# Patient Record
Sex: FEMALE | Race: BLACK OR AFRICAN AMERICAN | NOT HISPANIC OR LATINO | URBAN - METROPOLITAN AREA
[De-identification: names, ages, dates, MRNs, and addresses within clinical notes are randomized per-mention and may not be internally consistent; named-entity substitution may affect disease eponyms.]

---

## 2019-12-27 ENCOUNTER — COMMUNICATION - HEALTHEAST (OUTPATIENT)
Dept: FAMILY MEDICINE | Facility: CLINIC | Age: 29
End: 2019-12-27

## 2020-02-03 ENCOUNTER — OFFICE VISIT - HEALTHEAST (OUTPATIENT)
Dept: SURGERY | Facility: CLINIC | Age: 30
End: 2020-02-03

## 2020-02-03 DIAGNOSIS — O22.00 VARICOSE VEINS DURING PREGNANCY, ANTEPARTUM: ICD-10-CM

## 2020-02-03 DIAGNOSIS — K40.20 NON-RECURRENT BILATERAL INGUINAL HERNIA WITHOUT OBSTRUCTION OR GANGRENE: ICD-10-CM

## 2020-02-03 DIAGNOSIS — Z33.1 PREGNANT STATE, INCIDENTAL: ICD-10-CM

## 2021-06-04 VITALS
SYSTOLIC BLOOD PRESSURE: 118 MMHG | HEART RATE: 91 BPM | WEIGHT: 182.4 LBS | DIASTOLIC BLOOD PRESSURE: 56 MMHG | OXYGEN SATURATION: 99 %

## 2021-06-04 NOTE — TELEPHONE ENCOUNTER
New Appointment Needed  What is the reason for the visit:    Pregnancy Confirmation Appt Needed  When was the first day of your last menstrual cycle?: 7/27/19  Have you done a home pregnancy test?: Yes: Patient unsure of date of positive pregnancy test. Patient stated she was seen at the ED at [facility] on 12/10/19 & her pregnancy was confirmed with a due date of 5/2/2020.    Provider Preference: Any available  How soon do you need to be seen?: next week  Waitlist offered?: No  Okay to leave a detailed message:  Yes

## 2021-06-04 NOTE — TELEPHONE ENCOUNTER
12/27 - Pt called back but prefers to see an OB GYN and she also would like to deliver at [place].

## 2021-06-05 NOTE — PROGRESS NOTES
[facility]    HPI:    29 y.o. year old female who I have been consulted by Miri Ledesma MD  for evaluation of Hernia (R ing hernia, not tests, Not WC)  She is pleasant 29-year-old woman who has 27 weeks pregnant.  This is her second baby.  First baby was delivered vaginally.  She has a history though of PE during her last pregnancy and is currently on Lovenox.  Secondary to the PE she suffered during her last pregnancy.  Presently having troubles now with her hernia over the last few weeks time.  And is symptomatic and causing pain and symptoms on her right side.  Here for evaluation.  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    Allergies:Cephalosporins    History reviewed. No pertinent past medical history.    Past Surgical History:   Procedure Laterality Date     TONSILLECTOMY       VULVA SURGERY         CURRENT MEDS:  Current Outpatient Medications on File Prior to Visit   Medication Sig Dispense Refill     enoxaparin ANTICOAGULANT (LOVENOX) 40 mg/0.4 mL syringe        prenatal no115-iron-folic acid 29 mg iron- 1 mg Chew Chew daily.       No current facility-administered medications on file prior to visit.        History reviewed. No pertinent family history.     reports that she has never smoked. She has never used smokeless tobacco. She reports previous alcohol use.    Review of Systems:  Negative except pregnancy.  She has a swelling of her legs pain of her legs and varicosities noted on the right side and really symptomatic hernia on the right side noted also.  History of PE no shortness of breath or breathing difficulties presently. Otherwise twelve system of review is negative.      OBJECTIVE:  Vitals:    02/03/20 1133   BP: 118/56   Patient Site: Right Arm   Patient Position: Sitting   Cuff Size: Adult Regular   Pulse: 91   SpO2: 99%   Weight: 182 lb 6.4 oz (82.7 kg)     There is no height or weight on file  to calculate BMI.    EXAM:  GENERAL: This is a well-developed 29 y.o. female who appears her stated age  HEAD: normocephalic  HEENT: Pupils equal and reactive bilaterally  MOUTH: mucus membranes intact  CARDIAC: RRR without murmur  CHEST/LUNG:  Clear to auscultation  ABDOMEN: Soft, nontender, baby is expected about 4 to 5 cm above her umbilicus and the as far as the uterus size.  She has maybe a small weakness around her umbilicus.  She has a hernia on the right side which is reducible and none noted on the left.  This is inguinal femoral pulses are intact and no femoral hernias are noted  EXTREMITIES: Grossly normal, warm,   NEUROLOGIC: Focally intact, nonfocal  INTEGUMENT: No open lesions or ulcers  VASCULAR: Pulses intact, symmetrical upper and lower extremities.  Varicosities noted on the right side      LABS:  No results found for: WBC, HGB, HCT, MCV, PLT  INR/Prothrombin Time      No results found for: HGBA1C  No results found for: ALT, AST, GGT, ALKPHOS, BILITOT     Assessment/Plan:   1. Pregnant state, incidental  - Compression stockings    2. Non recurrent right sided inguinal hernia  Is a complex case with a pregnant woman who was in 27 weeks of her pregnancy.  She has a history of PEs on anticoagulation.  So hernia repair would had to be done open at this time point but she has that I think significant risks with clotting problems clotting history and this would put her even higher risk.  She does stop her anticoagulation for a day or so time.  Vicenta do her surgery and then she still has expanding abdominal area during the rest of her pregnancy which are little repair may cause issues with mesh use.  I think that the conservative routes prior the best option to get her some compression of the be pantyhose compression belt for OB/GYN GYN or even possibly hernia truss through orthotist if needed to try and get her through the pregnancy without doing this repair until afterwards.  Repair at less risk for  clotting problems and issues less risk to her baby and this risk of of hernia complications.    I did call her primary doctor Dr. Ledesma and discussed with her directly and she agrees    - Compression stockings    3. Varicose veins during pregnancy, antepartum    - Compression stockings      Return follow up after delivery.     Marin Masterson MD  [facility] Department of Surgery

## 2021-06-18 NOTE — PATIENT INSTRUCTIONS - HE
"Patient Instructions by Marin Masterson MD at 2/3/2020 11:20 AM     Author: Marin Masterson MD Service: -- Author Type: Physician    Filed: 2/3/2020 12:43 PM Encounter Date: 2/3/2020 Status: Signed    : Marin Masterson MD (Physician)       We are prescribing some compression stockings for you. I have included different suppliers that should help you get measured and fitting to ensure proper fitting socks. You should wear this socks as much as you can. It is especially important to wear them with long periods of sitting/standing, long car rides or if you will be flying. Compression socks should get refilled every 4-6 months. They do not need to be worn at night while in bed.    If you do a lot of standing it is good to do calf raises to help keep the blood pumping. If you sit a lot at work it is good to get up periodically to walk around. Elevation of the foot of your bed 4-6\" helps the blood return back to where it is needed.    We would like you to follow up in 3 months after wearing socks to see how you are doing.  [facility].      [facility]  [place] [phone]   [address]  [place]  [address]    [place] [phone]    [facility] [phone]    [place] [phone]  [address] [place] [address] Bldg.   [address]  [place] [address]  Phone: [phone]  Fax: [phone]    [facility] Bldg.  [address]  [place] [address]  Phone: [phone]  Fax: [phone]    [facility]  [address]  [place] [address]  Phone: [phone]  Fax: [phone]    [facility]  [address] [place] [address] [address]   [place] [address]   Phone: [phone]  Fax: [phone]    [address] [place] " [address]   Phone: [phone]  Fax: [phone]        [facility]    [address]  [place] [address]    [place] [phone]  [phone]  [place] [phone]  [email or web address]      [facility]   [address]             [address]                 [address]     [place] [address]      [place] [address]         [place] [address]  [phone]  [phone]     [phone]               Fax: [phone]  [facility]  [address] [place], [place] [address] [phone]  [phone]  [email or web address]    Quang Kaur  [phone]  [email or web address]    [facility]   [phone]    [facility]  [address] [place] [address]  [phone]        Understanding Spider and Varicose Veins      Do you often hide your legs because of the way they look? You may have noticed tiny red or blue bursts (spider veins). Or maybe you have veins that bulge or look twisted (varicose veins). If so, there are treatments that can help.    What Are the Symptoms?  Spider veins or varicose veins may never be a problem. But sometimes they can cause legs to ache or swell. Your legs may also feel heavy and tired, or like theyre burning. These symptoms may be more severe at the end of the day. Prolonged sitting or standing can also make your symptoms worse.        Who Gets Spider and Varicose Veins?  Anyone can get spider or varicose veins. But vein problems tend to be hereditary (run in families). Other factors that can affect veins include:    Pregnancy, hormones, and birth control pills    A job where you stand or sit a lot    Extra weight or lack of exercise    Age    What Can Be Done?  Spider and varicose veins  can affect the way you feel about yourself. Talk to your doctor about your concerns. There are treatments that can ease symptoms and make your legs look better.  Your Treatment Options  Treatment may include self-care, sclerotherapy (injecting veins with a chemical), or surgery. Spider veins and some varicose veins can be treated with sclerotherapy. Large varicose veins may need surgery.    6125-2357 [employer] [address] [place] [address], [place] [address]. All rights reserved. This information is not intended as a substitute for professional medical care. Always follow your healthcare professional's instructions.      Ultrasound    Thank you for choosing [facility] as partners in your care.  Please read the following information before your appointment.  Feel free to ask questions.    An ultrasound is an exam that uses sound waves to create pictures.  The special camera that takes the pictures is called a transducer.  An ultrasound technologist will perform the exam under the direction of a physician.    Preparation  Ultrasound preps vary.  If you are scheduled for an Aorta Ultrasound, please do not eat or drink anything 8 hours before your exam.  You may take daily medications with a small sip of water.  There is no preparation required for any of the other ultrasound exams performed at [facility].    The Examination  You may or may not need to change clothes for your exam.  The technologist will explain the exam to you.  He or she will ask you a few questions and answer any questions you may have.    You will lie on a table and a gel will be applied to your skin.  A small handheld instrument called a transducer will be moved across the area we are looking at while pictures are taken.  Also, your exam may include measuring your blood pressure on your arms, legs and/or toes.    When the Exam Is Completed  Your physician will receive the results of the exam and explain  them to you.  You may return to your normal diet and activities unless otherwise directed by your doctor.  Feel free to ask questions if something is not clear to you.  We welcome comments.    At [facility], we are dedicated to providing the best possible care.  Thank you for taking time to read these instructions.  We hope your experience is as pleasant as possible.      You are scheduled for the following exam(s):    [x]Venous Duplex:  Evaluates the veins that carry blood to the heart from the arms and/or legs.  Gel is applied to the skin of the arms and/or legs.  A transducer will be placed on the gel covered areas to obtain images and evaluate flow in the veins.  This exam takes approximately 30 minutes per arm/leg.

## 2022-07-05 ENCOUNTER — LAB REQUISITION (OUTPATIENT)
Dept: LAB | Facility: CLINIC | Age: 32
End: 2022-07-05

## 2022-07-05 DIAGNOSIS — Z20.822 CONTACT WITH AND (SUSPECTED) EXPOSURE TO COVID-19: ICD-10-CM

## 2022-07-11 ENCOUNTER — LAB REQUISITION (OUTPATIENT)
Dept: LAB | Facility: CLINIC | Age: 32
End: 2022-07-11

## 2022-07-11 DIAGNOSIS — Z20.822 CONTACT WITH AND (SUSPECTED) EXPOSURE TO COVID-19: ICD-10-CM

## 2022-07-25 ENCOUNTER — LAB REQUISITION (OUTPATIENT)
Dept: LAB | Facility: CLINIC | Age: 32
End: 2022-07-25
Payer: COMMERCIAL

## 2022-07-25 DIAGNOSIS — Z20.822 CONTACT WITH AND (SUSPECTED) EXPOSURE TO COVID-19: ICD-10-CM

## 2022-07-28 LAB — SARS-COV-2 RNA RESP QL NAA+PROBE: NEGATIVE

## 2022-07-28 PROCEDURE — U0003 INFECTIOUS AGENT DETECTION BY NUCLEIC ACID (DNA OR RNA); SEVERE ACUTE RESPIRATORY SYNDROME CORONAVIRUS 2 (SARS-COV-2) (CORONAVIRUS DISEASE [COVID-19]), AMPLIFIED PROBE TECHNIQUE, MAKING USE OF HIGH THROUGHPUT TECHNOLOGIES AS DESCRIBED BY CMS-2020-01-R: HCPCS | Performed by: FAMILY MEDICINE

## 2022-09-14 ENCOUNTER — LAB REQUISITION (OUTPATIENT)
Dept: LAB | Facility: CLINIC | Age: 32
End: 2022-09-14
Payer: COMMERCIAL

## 2022-09-14 DIAGNOSIS — Z20.822 CONTACT WITH AND (SUSPECTED) EXPOSURE TO COVID-19: ICD-10-CM

## 2022-09-15 PROCEDURE — U0003 INFECTIOUS AGENT DETECTION BY NUCLEIC ACID (DNA OR RNA); SEVERE ACUTE RESPIRATORY SYNDROME CORONAVIRUS 2 (SARS-COV-2) (CORONAVIRUS DISEASE [COVID-19]), AMPLIFIED PROBE TECHNIQUE, MAKING USE OF HIGH THROUGHPUT TECHNOLOGIES AS DESCRIBED BY CMS-2020-01-R: HCPCS | Mod: ORL | Performed by: FAMILY MEDICINE

## 2022-09-16 LAB — SARS-COV-2 RNA RESP QL NAA+PROBE: NEGATIVE

## 2022-09-21 ENCOUNTER — LAB REQUISITION (OUTPATIENT)
Dept: LAB | Facility: CLINIC | Age: 32
End: 2022-09-21

## 2022-09-21 DIAGNOSIS — Z20.822 CONTACT WITH AND (SUSPECTED) EXPOSURE TO COVID-19: ICD-10-CM
